# Patient Record
Sex: FEMALE | Race: BLACK OR AFRICAN AMERICAN | NOT HISPANIC OR LATINO | URBAN - METROPOLITAN AREA
[De-identification: names, ages, dates, MRNs, and addresses within clinical notes are randomized per-mention and may not be internally consistent; named-entity substitution may affect disease eponyms.]

---

## 2018-09-28 ENCOUNTER — OFFICE VISIT (OUTPATIENT)
Dept: URBAN - METROPOLITAN AREA CLINIC 33 | Facility: CLINIC | Age: 63
End: 2018-09-28
Payer: COMMERCIAL

## 2018-09-28 DIAGNOSIS — H35.362 DRUSEN (DEGENERATIVE) OF MACULA, LEFT EYE: Primary | ICD-10-CM

## 2018-09-28 DIAGNOSIS — H25.813 COMBINED FORMS OF AGE-RELATED CATARACT, BILATERAL: ICD-10-CM

## 2018-09-28 PROCEDURE — 92134 CPTRZ OPH DX IMG PST SGM RTA: CPT | Performed by: OPHTHALMOLOGY

## 2018-09-28 PROCEDURE — 99214 OFFICE O/P EST MOD 30 MIN: CPT | Performed by: OPHTHALMOLOGY

## 2018-09-28 ASSESSMENT — INTRAOCULAR PRESSURE
OS: 17
OD: 16

## 2018-09-28 NOTE — IMPRESSION/PLAN
Impression: Combined forms of age-related cataract, bilateral: H25.813. Plan: Cataracts account for the patient's complaints. No treatment currently recommended. The patient will monitor vision changes and contact us with any decrease in vision. Refraction at patient convenience.

## 2018-09-28 NOTE — IMPRESSION/PLAN
Impression: Drusen (degenerative) of macula, left eye: H35.362. Condition: will continue to monitor. Symptoms: will continue to monitor. Plan: OCT OU Ordered and performed today and results discussed with patient. Discussed diagnosis in detail with patient. No treatment is required at this time. Discussed risks of progression. Use of vitamins has shown to improve the effects of ARMD. Will continue to observe condition and or symptoms. Call if [address] worsens.

## 2019-10-25 ENCOUNTER — OFFICE VISIT (OUTPATIENT)
Dept: URBAN - METROPOLITAN AREA CLINIC 33 | Facility: CLINIC | Age: 64
End: 2019-10-25
Payer: COMMERCIAL

## 2019-10-25 PROCEDURE — 99214 OFFICE O/P EST MOD 30 MIN: CPT | Performed by: OPHTHALMOLOGY

## 2019-10-25 RX ORDER — PREDNISOLONE ACETATE 10 MG/ML
1 % SUSPENSION/ DROPS OPHTHALMIC
Qty: 10 | Refills: 1 | Status: INACTIVE
Start: 2019-10-25 | End: 2019-11-26

## 2019-10-25 RX ORDER — OFLOXACIN 3 MG/ML
0.3 % SOLUTION/ DROPS OPHTHALMIC
Qty: 5 | Refills: 1 | Status: INACTIVE
Start: 2019-10-25 | End: 2019-11-26

## 2019-10-25 ASSESSMENT — VISUAL ACUITY
OD: 20/25
OS: 20/30

## 2019-10-25 ASSESSMENT — KERATOMETRY
OS: 42.75
OD: 42.75

## 2019-10-25 ASSESSMENT — INTRAOCULAR PRESSURE
OD: 15
OS: 15

## 2019-11-26 ENCOUNTER — TESTING ONLY (OUTPATIENT)
Dept: URBAN - METROPOLITAN AREA CLINIC 33 | Facility: CLINIC | Age: 64
End: 2019-11-26
Payer: COMMERCIAL

## 2019-11-26 DIAGNOSIS — H25.13 AGE-RELATED NUCLEAR CATARACT, BILATERAL: Primary | ICD-10-CM

## 2019-11-26 PROCEDURE — 92136 OPHTHALMIC BIOMETRY: CPT | Performed by: OPHTHALMOLOGY

## 2019-11-26 PROCEDURE — 92025 CPTRIZED CORNEAL TOPOGRAPHY: CPT | Performed by: OPHTHALMOLOGY

## 2019-12-09 ENCOUNTER — SURGERY (OUTPATIENT)
Dept: URBAN - METROPOLITAN AREA SURGERY 15 | Facility: SURGERY | Age: 64
End: 2019-12-09
Payer: COMMERCIAL

## 2019-12-09 PROCEDURE — 66984 XCAPSL CTRC RMVL W/O ECP: CPT | Performed by: OPHTHALMOLOGY

## 2019-12-10 ENCOUNTER — POST-OPERATIVE VISIT (OUTPATIENT)
Dept: URBAN - METROPOLITAN AREA CLINIC 33 | Facility: CLINIC | Age: 64
End: 2019-12-10

## 2019-12-10 PROCEDURE — 99024 POSTOP FOLLOW-UP VISIT: CPT | Performed by: OPTOMETRIST

## 2019-12-10 ASSESSMENT — INTRAOCULAR PRESSURE
OS: 34
OD: 17
OS: 26

## 2019-12-17 ENCOUNTER — POST-OPERATIVE VISIT (OUTPATIENT)
Dept: URBAN - METROPOLITAN AREA CLINIC 33 | Facility: CLINIC | Age: 64
End: 2019-12-17

## 2019-12-17 PROCEDURE — 99024 POSTOP FOLLOW-UP VISIT: CPT | Performed by: OPHTHALMOLOGY

## 2019-12-17 ASSESSMENT — VISUAL ACUITY
OD: 20/25
OS: 20/20

## 2019-12-17 ASSESSMENT — INTRAOCULAR PRESSURE
OD: 14
OS: 18

## 2019-12-31 ENCOUNTER — POST-OPERATIVE VISIT (OUTPATIENT)
Dept: URBAN - METROPOLITAN AREA CLINIC 33 | Facility: CLINIC | Age: 64
End: 2019-12-31

## 2019-12-31 PROCEDURE — 99024 POSTOP FOLLOW-UP VISIT: CPT | Performed by: OPTOMETRIST

## 2019-12-31 ASSESSMENT — INTRAOCULAR PRESSURE
OD: 14
OS: 14

## 2020-01-14 ENCOUNTER — SURGERY (OUTPATIENT)
Dept: URBAN - METROPOLITAN AREA SURGERY 15 | Facility: SURGERY | Age: 65
End: 2020-01-14
Payer: COMMERCIAL

## 2020-01-14 PROCEDURE — 66984 XCAPSL CTRC RMVL W/O ECP: CPT | Performed by: OPHTHALMOLOGY

## 2020-01-15 ENCOUNTER — POST-OPERATIVE VISIT (OUTPATIENT)
Dept: URBAN - METROPOLITAN AREA CLINIC 33 | Facility: CLINIC | Age: 65
End: 2020-01-15

## 2020-01-15 PROCEDURE — 99024 POSTOP FOLLOW-UP VISIT: CPT | Performed by: OPTOMETRIST

## 2020-01-15 ASSESSMENT — INTRAOCULAR PRESSURE
OS: 18
OD: 42
OD: 48
OD: 14

## 2020-01-16 ENCOUNTER — POST-OPERATIVE VISIT (OUTPATIENT)
Dept: URBAN - METROPOLITAN AREA CLINIC 33 | Facility: CLINIC | Age: 65
End: 2020-01-16

## 2020-01-16 PROCEDURE — 99024 POSTOP FOLLOW-UP VISIT: CPT | Performed by: OPTOMETRIST

## 2020-01-16 ASSESSMENT — INTRAOCULAR PRESSURE
OD: 12
OD: 8
OS: 14

## 2020-01-21 ENCOUNTER — POST-OPERATIVE VISIT (OUTPATIENT)
Dept: URBAN - METROPOLITAN AREA CLINIC 33 | Facility: CLINIC | Age: 65
End: 2020-01-21

## 2020-01-21 PROCEDURE — 99024 POSTOP FOLLOW-UP VISIT: CPT | Performed by: OPHTHALMOLOGY

## 2020-01-21 ASSESSMENT — VISUAL ACUITY: OD: 20/25

## 2020-01-21 ASSESSMENT — INTRAOCULAR PRESSURE
OD: 12
OS: 12

## 2020-02-11 ENCOUNTER — POST-OPERATIVE VISIT (OUTPATIENT)
Dept: URBAN - METROPOLITAN AREA CLINIC 33 | Facility: CLINIC | Age: 65
End: 2020-02-11

## 2020-02-11 DIAGNOSIS — Z09 ENCNTR FOR F/U EXAM AFT TRTMT FOR COND OTH THAN MALIG NEOPLM: Primary | ICD-10-CM

## 2020-02-11 PROCEDURE — 99024 POSTOP FOLLOW-UP VISIT: CPT | Performed by: OPHTHALMOLOGY

## 2020-02-11 ASSESSMENT — INTRAOCULAR PRESSURE
OD: 12
OS: 12

## 2020-02-11 ASSESSMENT — VISUAL ACUITY: OD: 20/20

## 2020-02-25 ENCOUNTER — POST-OPERATIVE VISIT (OUTPATIENT)
Dept: URBAN - METROPOLITAN AREA CLINIC 33 | Facility: CLINIC | Age: 65
End: 2020-02-25

## 2020-02-25 PROCEDURE — 99024 POSTOP FOLLOW-UP VISIT: CPT | Performed by: OPHTHALMOLOGY

## 2020-02-25 ASSESSMENT — VISUAL ACUITY
OS: 20/20
OD: 20/20

## 2020-02-25 ASSESSMENT — INTRAOCULAR PRESSURE
OS: 13
OD: 13

## 2020-03-10 ENCOUNTER — POST-OPERATIVE VISIT (OUTPATIENT)
Dept: URBAN - METROPOLITAN AREA CLINIC 33 | Facility: CLINIC | Age: 65
End: 2020-03-10

## 2020-03-10 PROCEDURE — 99024 POSTOP FOLLOW-UP VISIT: CPT | Performed by: OPHTHALMOLOGY

## 2020-03-10 RX ORDER — PREDNISOLONE ACETATE 10 MG/ML
1 % SUSPENSION/ DROPS OPHTHALMIC
Qty: 10 | Refills: 1 | Status: INACTIVE
Start: 2020-03-10 | End: 2020-08-05

## 2020-03-10 ASSESSMENT — INTRAOCULAR PRESSURE
OS: 13
OD: 13

## 2020-05-19 ENCOUNTER — OFFICE VISIT (OUTPATIENT)
Dept: URBAN - METROPOLITAN AREA CLINIC 33 | Facility: CLINIC | Age: 65
End: 2020-05-19
Payer: COMMERCIAL

## 2020-05-19 DIAGNOSIS — H20.011 PRIMARY IRIDOCYCLITIS, RIGHT EYE: Primary | ICD-10-CM

## 2020-05-19 PROCEDURE — 99213 OFFICE O/P EST LOW 20 MIN: CPT | Performed by: OPHTHALMOLOGY

## 2020-05-19 ASSESSMENT — INTRAOCULAR PRESSURE
OS: 13
OD: 12

## 2020-05-19 ASSESSMENT — VISUAL ACUITY
OS: 20/20
OD: 20/20

## 2020-05-19 NOTE — IMPRESSION/PLAN
Impression: Primary iridocyclitis, right eye: H20.011. Condition: quality of life issue. Symptoms: will treat with meds. Plan: Discussed diagnosis in detail with patient. Discussed treatment options with patient. Recommend continuing PF TID OD. Defer any additional surgical treatment at this time. Observe. Call if symptoms worsen.

## 2020-07-14 ENCOUNTER — OFFICE VISIT (OUTPATIENT)
Dept: URBAN - METROPOLITAN AREA CLINIC 33 | Facility: CLINIC | Age: 65
End: 2020-07-14
Payer: COMMERCIAL

## 2020-07-14 PROCEDURE — 99213 OFFICE O/P EST LOW 20 MIN: CPT | Performed by: OPHTHALMOLOGY

## 2020-07-14 ASSESSMENT — INTRAOCULAR PRESSURE
OS: 12
OD: 12

## 2020-07-14 NOTE — IMPRESSION/PLAN
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 Plan: Discussed diagnosis in detail with patient. Discussed treatment options with patient. Discussed iridoplasty OD in the future. R/B/A discussed and understood by patient and patient will consider surgical treatment in the future.

## 2020-07-14 NOTE — IMPRESSION/PLAN
Impression: Primary iridocyclitis, right eye: H20.011. Condition: quality of life issue. Symptoms: will treat with meds. Plan: Discussed diagnosis in detail with patient. Discussed treatment options with patient. Discussed risks of progression. Decrease PF to BID OD. Will continue to observe. Call if symptoms worsen.

## 2020-08-05 ENCOUNTER — OFFICE VISIT (OUTPATIENT)
Dept: URBAN - METROPOLITAN AREA CLINIC 33 | Facility: CLINIC | Age: 65
End: 2020-08-05
Payer: COMMERCIAL

## 2020-08-05 PROCEDURE — 99213 OFFICE O/P EST LOW 20 MIN: CPT | Performed by: OPTOMETRIST

## 2020-08-05 RX ORDER — DUREZOL 0.5 MG/ML
0.05 % EMULSION OPHTHALMIC
Qty: 1 | Refills: 1 | Status: INACTIVE
Start: 2020-08-05 | End: 2021-01-11

## 2020-08-05 ASSESSMENT — INTRAOCULAR PRESSURE
OD: 11
OS: 12

## 2020-08-05 NOTE — IMPRESSION/PLAN
Impression: Primary iridocyclitis, right eye: H20.011. Condition: quality of life issue. Plan: Recurrent iridocyclitis of right eye post cataract surgery. Patient reports history of uveitis OU, prior to cataract surgery. Patient has been using Prednisolone for condition and discontinued 07/14/20. Recommend patient starts Durezol. RXd Durezol QID x 1 week then BID x 3 weeks

## 2020-09-02 ENCOUNTER — OFFICE VISIT (OUTPATIENT)
Dept: URBAN - METROPOLITAN AREA CLINIC 33 | Facility: CLINIC | Age: 65
End: 2020-09-02
Payer: COMMERCIAL

## 2020-09-02 PROCEDURE — 99213 OFFICE O/P EST LOW 20 MIN: CPT | Performed by: OPTOMETRIST

## 2020-09-02 ASSESSMENT — INTRAOCULAR PRESSURE
OD: 18
OS: 15

## 2020-09-02 NOTE — IMPRESSION/PLAN
Impression: Primary iridocyclitis, right eye: H20.011. Condition: quality of life issue. History of recurring uveitis. Plan: Resolved iridocyclitis OD. Recommend patient finishes Durezol. Patient may use ATs as needed.

## 2020-10-08 ENCOUNTER — OFFICE VISIT (OUTPATIENT)
Dept: URBAN - METROPOLITAN AREA CLINIC 33 | Facility: CLINIC | Age: 65
End: 2020-10-08
Payer: COMMERCIAL

## 2020-10-08 PROCEDURE — 99213 OFFICE O/P EST LOW 20 MIN: CPT | Performed by: OPTOMETRIST

## 2020-10-08 ASSESSMENT — INTRAOCULAR PRESSURE
OS: 11
OD: 15

## 2020-10-08 NOTE — IMPRESSION/PLAN
Impression: Primary iridocyclitis, right eye: H20.011. Plan: Recurrent Iritis OD since CE 1/14/2020. Has tapered off Durezol drops and symptoms return. Symptoms held at [place] [id number] [address] [place] [address]. Currently taking BID OD. Discussed recurrent iritis with patient and possible source of inflammation related to posterior synechaie OD. Recommend Durezol BID OD x 1 month then QD OD x 1 month. If iritis returns after d/c, see Dr. Xiomara Juarez for further evaluation. Consider subconj. steroid injection.

## 2020-12-22 ENCOUNTER — OFFICE VISIT (OUTPATIENT)
Dept: URBAN - METROPOLITAN AREA CLINIC 33 | Facility: CLINIC | Age: 65
End: 2020-12-22
Payer: MEDICARE

## 2020-12-22 PROCEDURE — 99213 OFFICE O/P EST LOW 20 MIN: CPT | Performed by: OPTOMETRIST

## 2020-12-22 ASSESSMENT — INTRAOCULAR PRESSURE
OS: 13
OD: 14

## 2020-12-22 NOTE — IMPRESSION/PLAN
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 Plan: Chronic iritis since 01/14/2020. Condition responds well to Durezol and then repeatedly returns after stopping. Discussed Posterior Synechaie as possible cause of recurrent inflammation. Refer to Dr. Lisandra Fay for evaluation and treatment. Consider Posterior Synechilysis vs. subconj. steroid injection.

## 2021-01-04 ENCOUNTER — OFFICE VISIT (OUTPATIENT)
Dept: URBAN - METROPOLITAN AREA CLINIC 33 | Facility: CLINIC | Age: 66
End: 2021-01-04
Payer: MEDICARE

## 2021-01-04 DIAGNOSIS — H21.541 POSTERIOR SYNECHIAE (IRIS), RIGHT EYE: ICD-10-CM

## 2021-01-04 PROCEDURE — 99213 OFFICE O/P EST LOW 20 MIN: CPT | Performed by: OPHTHALMOLOGY

## 2021-01-04 ASSESSMENT — INTRAOCULAR PRESSURE
OS: 13
OD: 16

## 2021-01-04 NOTE — IMPRESSION/PLAN
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 Plan: Discussed possible surgery in the future. Defer at this time.

## 2021-01-04 NOTE — IMPRESSION/PLAN
Impression: Primary iridocyclitis, right eye: H20.011. Plan: Discussed diagnosis in detail with patient. Discussed treatment options with patient. Current therapy is best for patient. Continue using current medication(s). Continue Durezol BID OD, then taper to QD OD then D/C.